# Patient Record
Sex: MALE | Race: WHITE | URBAN - METROPOLITAN AREA
[De-identification: names, ages, dates, MRNs, and addresses within clinical notes are randomized per-mention and may not be internally consistent; named-entity substitution may affect disease eponyms.]

---

## 2023-03-28 ENCOUNTER — OFFICE VISIT (OUTPATIENT)
Dept: CARDIOTHORACIC SURGERY | Age: 78
End: 2023-03-28
Payer: MEDICARE

## 2023-03-28 VITALS
SYSTOLIC BLOOD PRESSURE: 102 MMHG | BODY MASS INDEX: 23.57 KG/M2 | OXYGEN SATURATION: 95 % | WEIGHT: 150.2 LBS | HEIGHT: 67 IN | DIASTOLIC BLOOD PRESSURE: 60 MMHG | HEART RATE: 67 BPM

## 2023-03-28 DIAGNOSIS — I65.23 BILATERAL CAROTID ARTERY STENOSIS: Primary | ICD-10-CM

## 2023-03-28 PROCEDURE — G8427 DOCREV CUR MEDS BY ELIG CLIN: HCPCS | Performed by: THORACIC SURGERY (CARDIOTHORACIC VASCULAR SURGERY)

## 2023-03-28 PROCEDURE — 4004F PT TOBACCO SCREEN RCVD TLK: CPT | Performed by: THORACIC SURGERY (CARDIOTHORACIC VASCULAR SURGERY)

## 2023-03-28 PROCEDURE — 99205 OFFICE O/P NEW HI 60 MIN: CPT | Performed by: THORACIC SURGERY (CARDIOTHORACIC VASCULAR SURGERY)

## 2023-03-28 PROCEDURE — G8484 FLU IMMUNIZE NO ADMIN: HCPCS | Performed by: THORACIC SURGERY (CARDIOTHORACIC VASCULAR SURGERY)

## 2023-03-28 PROCEDURE — G8420 CALC BMI NORM PARAMETERS: HCPCS | Performed by: THORACIC SURGERY (CARDIOTHORACIC VASCULAR SURGERY)

## 2023-03-28 PROCEDURE — 1123F ACP DISCUSS/DSCN MKR DOCD: CPT | Performed by: THORACIC SURGERY (CARDIOTHORACIC VASCULAR SURGERY)

## 2023-03-28 RX ORDER — ATORVASTATIN CALCIUM 20 MG/1
20 TABLET, FILM COATED ORAL DAILY
COMMUNITY

## 2023-03-28 RX ORDER — DONEPEZIL HYDROCHLORIDE 10 MG/1
10 TABLET, FILM COATED ORAL NIGHTLY
COMMUNITY

## 2023-03-28 RX ORDER — VENLAFAXINE 75 MG/1
75 TABLET ORAL 3 TIMES DAILY
COMMUNITY

## 2023-03-28 NOTE — PROGRESS NOTES
history sections including the medications and allergies listed in the above medical record. I have also reviewed and discussed the [facility] documentation. I verified and agreed with the above documentation.     Ariadne Izaguirre MD 04/04/23 10:07 AM

## 2023-04-27 ENCOUNTER — TELEPHONE (OUTPATIENT)
Dept: CARDIOLOGY CLINIC | Age: 78
End: 2023-04-27

## 2023-04-28 ENCOUNTER — TELEPHONE (OUTPATIENT)
Dept: CARDIOLOGY CLINIC | Age: 78
End: 2023-04-28

## 2023-05-09 ENCOUNTER — OFFICE VISIT (OUTPATIENT)
Dept: CARDIOTHORACIC SURGERY | Age: 78
End: 2023-05-09

## 2023-05-09 VITALS
BODY MASS INDEX: 23.39 KG/M2 | HEART RATE: 69 BPM | OXYGEN SATURATION: 93 % | WEIGHT: 149 LBS | HEIGHT: 67 IN | DIASTOLIC BLOOD PRESSURE: 66 MMHG | SYSTOLIC BLOOD PRESSURE: 122 MMHG

## 2023-05-09 DIAGNOSIS — I65.23 BILATERAL CAROTID ARTERY STENOSIS: Primary | ICD-10-CM

## 2024-05-02 ENCOUNTER — TELEPHONE (OUTPATIENT)
Dept: CARDIOTHORACIC SURGERY | Age: 79
End: 2024-05-02

## 2024-05-02 NOTE — TELEPHONE ENCOUNTER
Called and spoke to patients wife to reschedule appt. Wife informed staff that patient is currently seeing Dr. Tijerina in [place] for lung cancer, he is currently being treated for immunotherapy. Appt wa rescheduled for 5/21/24 @ 1145 am .

## 2024-05-21 ENCOUNTER — OFFICE VISIT (OUTPATIENT)
Dept: CARDIOTHORACIC SURGERY | Age: 79
End: 2024-05-21

## 2024-05-21 VITALS
SYSTOLIC BLOOD PRESSURE: 96 MMHG | HEIGHT: 67 IN | WEIGHT: 140 LBS | BODY MASS INDEX: 21.97 KG/M2 | DIASTOLIC BLOOD PRESSURE: 54 MMHG | HEART RATE: 80 BPM

## 2024-05-21 DIAGNOSIS — I65.23 BILATERAL CAROTID ARTERY STENOSIS: Primary | ICD-10-CM

## 2024-05-21 RX ORDER — HYDROCODONE BITARTRATE AND ACETAMINOPHEN 7.5; 325 MG/1; MG/1
2 TABLET ORAL EVERY 6 HOURS PRN
COMMUNITY

## 2024-05-21 RX ORDER — GABAPENTIN 300 MG/1
300 CAPSULE ORAL 3 TIMES DAILY
COMMUNITY

## 2024-05-21 RX ORDER — LORATADINE 10 MG/1
10 TABLET ORAL DAILY
COMMUNITY

## 2024-05-21 RX ORDER — METHOCARBAMOL 750 MG/1
750 TABLET, FILM COATED ORAL 4 TIMES DAILY
COMMUNITY

## 2024-05-21 RX ORDER — ONDANSETRON 8 MG/1
8 TABLET, FILM COATED ORAL EVERY 8 HOURS PRN
COMMUNITY

## 2024-05-21 RX ORDER — CALCIUM CARBONATE 260MG(650)
TABLET,CHEWABLE ORAL
COMMUNITY

## 2024-05-22 ENCOUNTER — TELEPHONE (OUTPATIENT)
Dept: CARDIOTHORACIC SURGERY | Age: 79
End: 2024-05-22

## 2024-05-22 NOTE — TELEPHONE ENCOUNTER
Called and left patient a message asking that he call us back to let us know where he would like testing?

## 2024-06-06 ENCOUNTER — HOSPITAL ENCOUNTER (OUTPATIENT)
Dept: CT IMAGING | Age: 79
Discharge: HOME | End: 2024-06-06
Payer: MEDICARE

## 2024-06-06 DIAGNOSIS — I65.23 BILATERAL CAROTID ARTERY STENOSIS: ICD-10-CM

## 2024-06-06 PROCEDURE — 6360000004 HC RX CONTRAST MEDICATION: Performed by: NURSE PRACTITIONER

## 2024-06-06 PROCEDURE — 2580000003 HC RX 258: Performed by: NURSE PRACTITIONER

## 2024-06-06 PROCEDURE — 70498 CT ANGIOGRAPHY NECK: CPT

## 2024-06-06 RX ORDER — SODIUM CHLORIDE 9 MG/ML
10 INJECTION, SOLUTION INTRAMUSCULAR; INTRAVENOUS; SUBCUTANEOUS PRN
Status: COMPLETED | OUTPATIENT
Start: 2024-06-06 | End: 2024-06-06

## 2024-06-06 RX ADMIN — IOPAMIDOL 95 ML: 755 INJECTION, SOLUTION INTRAVENOUS at 10:55

## 2024-06-06 RX ADMIN — SODIUM CHLORIDE, PRESERVATIVE FREE 10 ML: 5 INJECTION INTRAVENOUS at 10:46

## 2024-06-24 ENCOUNTER — TELEPHONE (OUTPATIENT)
Dept: CARDIOTHORACIC SURGERY | Age: 79
End: 2024-06-24

## 2024-06-24 NOTE — TELEPHONE ENCOUNTER
CTA neck results (IMPRESSION:1. Stable calcified and noncalcified plaque at the right ICA, with unchanged approximately 60% stenosis by NASCET criteria. Additional unchanged calcified and noncalcified plaque at the left ICA with approximately 30% stenosis by  NASCET criteria) reviewed with Dr. Padilla on Friday, 6/21. Per Dr. Padilla, patient to follow-up in 1 year with CTA neck.     Appointment needs scheduled.    CTA neck order needs placed.

## 2024-06-24 NOTE — TELEPHONE ENCOUNTER
Poke with patient patient advised of the below and verbalizes understanding. Patient states that he will call back later to schedule his 1 year follow-up. Reminder made.     Please place order for CTA neck.

## 2024-06-26 DIAGNOSIS — I65.23 BILATERAL CAROTID ARTERY STENOSIS: Primary | ICD-10-CM

## 2024-12-19 ENCOUNTER — TELEPHONE (OUTPATIENT)
Dept: CARDIOTHORACIC SURGERY | Age: 79
End: 2024-12-19

## 2024-12-19 NOTE — TELEPHONE ENCOUNTER
Left message for patient to call the office to schedule appt  He is due for a CTA in may 2025  with a follow up with Dr. Padilla

## 2025-01-13 NOTE — TELEPHONE ENCOUNTER
Patient's wife called back, scheduled follow up 5-13-25, I gave her the number to central scheduling to schedule CTA